# Patient Record
Sex: MALE | Race: WHITE | NOT HISPANIC OR LATINO
[De-identification: names, ages, dates, MRNs, and addresses within clinical notes are randomized per-mention and may not be internally consistent; named-entity substitution may affect disease eponyms.]

---

## 2019-01-07 ENCOUNTER — APPOINTMENT (OUTPATIENT)
Dept: ORTHOPEDIC SURGERY | Facility: CLINIC | Age: 54
End: 2019-01-07
Payer: COMMERCIAL

## 2019-01-07 VITALS — BODY MASS INDEX: 24.91 KG/M2 | HEIGHT: 70 IN | WEIGHT: 174 LBS

## 2019-01-07 DIAGNOSIS — M75.41 IMPINGEMENT SYNDROME OF RIGHT SHOULDER: ICD-10-CM

## 2019-01-07 DIAGNOSIS — Z00.00 ENCOUNTER FOR GENERAL ADULT MEDICAL EXAMINATION W/OUT ABNORMAL FINDINGS: ICD-10-CM

## 2019-01-07 PROCEDURE — 99204 OFFICE O/P NEW MOD 45 MIN: CPT | Mod: 25

## 2019-01-07 PROCEDURE — 73030 X-RAY EXAM OF SHOULDER: CPT | Mod: RT

## 2019-01-07 PROCEDURE — 20610 DRAIN/INJ JOINT/BURSA W/O US: CPT | Mod: RT

## 2019-01-07 NOTE — PHYSICAL EXAM
[de-identified] : Constitutional\par o Appearance : well-nourished, well developed, alert, in no acute distress \par Head and Face\par o Head :\par ¦ Inspection : atraumatic, normocephalic\par Neurologic\par o Mental Status Examination :\par ¦ Orientation : alert and oriented X 3\par Psychiatric\par o Mood and Affect: mood normal, affect appropriate \par Cardiovascular\par o Observation/Palpation : - no swelling,normal pulses, normal temperature \par Lymphatic\par o Additional Nodes : No palpable lymph nodes present \par \par Cervical Spine\par o Inspection/Palpation :\par ¦ Inspection : alignment midline, normal degree of lordosis present, no paracervical tenderness\par ¦ Skin : normal appearance, no masses or tenderness, trachea midline\par ¦ Palpation : musculature is nontender to palpation\par o Range of Motion : arc of motion full in all planes, no crepitance or pain with ROM\par Tests: Negative Spurling test, negative Romberg test\par \par Right Upper Extremity\par o Shoulder :\par ¦ Inspection/Palpation :mild anterior capsular  tenderness, swelling or deformities\par ¦ Range of Motion : full motion and minimal discomfort at the extremes of motion, no crepitance\par ¦ Strength : forward elevation, internal rotation, external rotation 4/5, adduction and abduction 5/5, forward flexion and supraspinatus 4-/5, bicep 5/5\par ¦ Stability : no joint instability on provocative testing \par Tests: Jaimes negative, Neer negative, positive Sariah test, negative drop arm test\par \par Left Upper Extremity\par o Shoulder :\par ¦ Inspection/Palpation : no tenderness, swelling or deformities\par ¦ Range of Motion : full and painless in all planes, no crepitance\par ¦ Strength : forward elevation, internal rotation, external rotation, adduction and abduction 5/5\par ¦ Stability : no joint instability on provocative testing\par Tests: Jaimes negative, Neer negative, negative Sariah test, negative drop arm test\par Gait: Normal, no significant extremity swelling or lymphedema\par \par Radiology Results \par o Right Shoulder : AP internal/external rotation and outlet views were obtained and reveal sclerosis of greater tuberosity with mild glenohumeral arthritis \par \par Injection:\par o Shoulder: Indication- shoulder arthritis, Anatomic location right subacromial space, Spray-area was sterilized with Betadine and alcohol and anesthetized with Ethyl Chloride , Needle used - 22G, Medications given- 5cc's lidocaine, 0.5cc's kenalog, 0.5cc's dexamethasone, and patient tolerated it well. This was done under Ultrasound guidance.\par Exam after the subacromial injection revealed complete improvement of his impingement signs and his strength returned to normal\par  \par \par

## 2019-01-07 NOTE — ADDENDUM
[FreeTextEntry1] : I, Matthew Shaffer , acted solely as a scribe for Dr. Valdez Garcia on this date 01/07/2019.\par All medical record entries made by the Scribe were at my, Dr. Valdez Garcia, direction and personally dictated by me on 01/07/2019. I have reviewed the chart and agree that the record accurately reflects my personal performance of the history, physical exam, assessment and plan. I have also personally directed, reviewed, and agreed with the chart.

## 2019-01-07 NOTE — HISTORY OF PRESENT ILLNESS
[de-identified] : Patient presents with right shoulder pain exacerbated by lifting his right arm. He feels pain when he lies on his right shoulder, but it does not wake him up from sleep. He  notes no recent injury. He is right-hand dominant. He denies numbness, tingling, and neck pain. He is generally healthy. Some weightlifting recently and may have aggravated it.  He notes it's going on at least 3 months.  He is right-hand dominant.  It does affect his sleep and he has difficulty lying on the right shoulder.

## 2019-01-07 NOTE — DISCUSSION/SUMMARY
[de-identified] : Patient elected to receive a cortisone injection into his right shoulder, he tolerated it well. His right shoulder strength was improved after the injection. He is to avoid abduction motion of his right arm, heavy loading his right shoulder and overhead activities. We discussed the use of ice to relieve pain. Follow-up in 4- 6 weeks.

## 2021-11-26 ENCOUNTER — EMERGENCY (EMERGENCY)
Facility: HOSPITAL | Age: 56
LOS: 1 days | Discharge: HOME | End: 2021-11-26
Attending: EMERGENCY MEDICINE
Payer: COMMERCIAL

## 2021-11-26 VITALS
DIASTOLIC BLOOD PRESSURE: 82 MMHG | OXYGEN SATURATION: 97 % | TEMPERATURE: 98 F | SYSTOLIC BLOOD PRESSURE: 124 MMHG | RESPIRATION RATE: 16 BRPM | HEART RATE: 94 BPM

## 2021-11-26 VITALS
WEIGHT: 160.06 LBS | HEIGHT: 69 IN | RESPIRATION RATE: 20 BRPM | HEART RATE: 114 BPM | SYSTOLIC BLOOD PRESSURE: 133 MMHG | OXYGEN SATURATION: 97 % | TEMPERATURE: 98 F | DIASTOLIC BLOOD PRESSURE: 91 MMHG

## 2021-11-26 LAB
ALBUMIN SERPL ELPH-MCNC: 4.5 G/DL — SIGNIFICANT CHANGE UP (ref 3.3–5)
ALP SERPL-CCNC: 96 U/L — SIGNIFICANT CHANGE UP (ref 40–120)
ALT FLD-CCNC: 22 U/L — SIGNIFICANT CHANGE UP (ref 10–45)
ANION GAP SERPL CALC-SCNC: 14 MMOL/L — SIGNIFICANT CHANGE UP (ref 5–17)
AST SERPL-CCNC: 22 U/L — SIGNIFICANT CHANGE UP (ref 10–40)
BASOPHILS # BLD AUTO: 0.06 K/UL — SIGNIFICANT CHANGE UP (ref 0–0.2)
BASOPHILS NFR BLD AUTO: 0.8 % — SIGNIFICANT CHANGE UP (ref 0–2)
BILIRUB SERPL-MCNC: 0.3 MG/DL — SIGNIFICANT CHANGE UP (ref 0.2–1.2)
BUN SERPL-MCNC: 20 MG/DL — SIGNIFICANT CHANGE UP (ref 7–23)
CALCIUM SERPL-MCNC: 9.6 MG/DL — SIGNIFICANT CHANGE UP (ref 8.4–10.5)
CHLORIDE SERPL-SCNC: 102 MMOL/L — SIGNIFICANT CHANGE UP (ref 96–108)
CO2 SERPL-SCNC: 23 MMOL/L — SIGNIFICANT CHANGE UP (ref 22–31)
CREAT SERPL-MCNC: 1.02 MG/DL — SIGNIFICANT CHANGE UP (ref 0.5–1.3)
EOSINOPHIL # BLD AUTO: 0.19 K/UL — SIGNIFICANT CHANGE UP (ref 0–0.5)
EOSINOPHIL NFR BLD AUTO: 2.5 % — SIGNIFICANT CHANGE UP (ref 0–6)
GLUCOSE SERPL-MCNC: 135 MG/DL — HIGH (ref 70–99)
HCT VFR BLD CALC: 48.3 % — SIGNIFICANT CHANGE UP (ref 39–50)
HGB BLD-MCNC: 16.1 G/DL — SIGNIFICANT CHANGE UP (ref 13–17)
IMM GRANULOCYTES NFR BLD AUTO: 0.3 % — SIGNIFICANT CHANGE UP (ref 0–1.5)
LYMPHOCYTES # BLD AUTO: 2.5 K/UL — SIGNIFICANT CHANGE UP (ref 1–3.3)
LYMPHOCYTES # BLD AUTO: 32.9 % — SIGNIFICANT CHANGE UP (ref 13–44)
MCHC RBC-ENTMCNC: 30.6 PG — SIGNIFICANT CHANGE UP (ref 27–34)
MCHC RBC-ENTMCNC: 33.3 GM/DL — SIGNIFICANT CHANGE UP (ref 32–36)
MCV RBC AUTO: 91.7 FL — SIGNIFICANT CHANGE UP (ref 80–100)
MONOCYTES # BLD AUTO: 0.53 K/UL — SIGNIFICANT CHANGE UP (ref 0–0.9)
MONOCYTES NFR BLD AUTO: 7 % — SIGNIFICANT CHANGE UP (ref 2–14)
NEUTROPHILS # BLD AUTO: 4.29 K/UL — SIGNIFICANT CHANGE UP (ref 1.8–7.4)
NEUTROPHILS NFR BLD AUTO: 56.5 % — SIGNIFICANT CHANGE UP (ref 43–77)
NRBC # BLD: 0 /100 WBCS — SIGNIFICANT CHANGE UP (ref 0–0)
PLATELET # BLD AUTO: 264 K/UL — SIGNIFICANT CHANGE UP (ref 150–400)
POTASSIUM SERPL-MCNC: 3.8 MMOL/L — SIGNIFICANT CHANGE UP (ref 3.5–5.3)
POTASSIUM SERPL-SCNC: 3.8 MMOL/L — SIGNIFICANT CHANGE UP (ref 3.5–5.3)
PROT SERPL-MCNC: 7.6 G/DL — SIGNIFICANT CHANGE UP (ref 6–8.3)
RBC # BLD: 5.27 M/UL — SIGNIFICANT CHANGE UP (ref 4.2–5.8)
RBC # FLD: 12.5 % — SIGNIFICANT CHANGE UP (ref 10.3–14.5)
SODIUM SERPL-SCNC: 139 MMOL/L — SIGNIFICANT CHANGE UP (ref 135–145)
WBC # BLD: 7.59 K/UL — SIGNIFICANT CHANGE UP (ref 3.8–10.5)
WBC # FLD AUTO: 7.59 K/UL — SIGNIFICANT CHANGE UP (ref 3.8–10.5)

## 2021-11-26 PROCEDURE — 85025 COMPLETE CBC W/AUTO DIFF WBC: CPT

## 2021-11-26 PROCEDURE — 80053 COMPREHEN METABOLIC PANEL: CPT

## 2021-11-26 PROCEDURE — 99284 EMERGENCY DEPT VISIT MOD MDM: CPT

## 2021-11-26 PROCEDURE — 96374 THER/PROPH/DIAG INJ IV PUSH: CPT

## 2021-11-26 PROCEDURE — 36415 COLL VENOUS BLD VENIPUNCTURE: CPT

## 2021-11-26 PROCEDURE — 96375 TX/PRO/DX INJ NEW DRUG ADDON: CPT

## 2021-11-26 PROCEDURE — 99284 EMERGENCY DEPT VISIT MOD MDM: CPT | Mod: 25

## 2021-11-26 RX ORDER — DIPHENHYDRAMINE HCL 50 MG
50 CAPSULE ORAL ONCE
Refills: 0 | Status: COMPLETED | OUTPATIENT
Start: 2021-11-26 | End: 2021-11-26

## 2021-11-26 RX ORDER — SODIUM CHLORIDE 9 MG/ML
1000 INJECTION INTRAMUSCULAR; INTRAVENOUS; SUBCUTANEOUS ONCE
Refills: 0 | Status: COMPLETED | OUTPATIENT
Start: 2021-11-26 | End: 2021-11-26

## 2021-11-26 RX ORDER — EPINEPHRINE 0.3 MG/.3ML
0.3 INJECTION INTRAMUSCULAR; SUBCUTANEOUS
Qty: 1 | Refills: 0
Start: 2021-11-26

## 2021-11-26 RX ORDER — FAMOTIDINE 10 MG/ML
20 INJECTION INTRAVENOUS ONCE
Refills: 0 | Status: COMPLETED | OUTPATIENT
Start: 2021-11-26 | End: 2021-11-26

## 2021-11-26 RX ADMIN — SODIUM CHLORIDE 1000 MILLILITER(S): 9 INJECTION INTRAMUSCULAR; INTRAVENOUS; SUBCUTANEOUS at 18:41

## 2021-11-26 RX ADMIN — Medication 125 MILLIGRAM(S): at 17:57

## 2021-11-26 RX ADMIN — FAMOTIDINE 20 MILLIGRAM(S): 10 INJECTION INTRAVENOUS at 17:57

## 2021-11-26 RX ADMIN — Medication 50 MILLIGRAM(S): at 17:57

## 2021-11-26 RX ADMIN — SODIUM CHLORIDE 1000 MILLILITER(S): 9 INJECTION INTRAMUSCULAR; INTRAVENOUS; SUBCUTANEOUS at 18:48

## 2021-11-26 NOTE — ED PROVIDER NOTE - ATTENDING CONTRIBUTION TO CARE
I performed a history and physical exam of the patient and discussed their management with the resident and /or advanced care provider. I reviewed the resident and /or ACP's note and agree with the documented findings and plan of care. My medical decision making and observations are found above.  Lung clear, abd soft, no stridor, itchy red rash.

## 2021-11-26 NOTE — ED PROVIDER NOTE - OBJECTIVE STATEMENT
57y/o M w/ h/o allergies to almonds p/w SOB and rash. Pt states that he was gym and felt hot rash on upper body and difficulty breathing apprx 1 h prior to arrival. No chest pain, syncope, nausea, vomiting, diarrhea, abdominal pain.

## 2021-11-26 NOTE — ED PROVIDER NOTE - NSFOLLOWUPINSTRUCTIONS_ED_ALL_ED_FT
Use prednisone for next 2 days. Use benadryl for itch as needed or if symptoms return at any point.   Consider having epi pen at home.  Return to Emergency Department if you get return of mouth or throat swelling or if you have new complaints.

## 2021-11-26 NOTE — ED ADULT TRIAGE NOTE - CHIEF COMPLAINT QUOTE
allergic reaction while in gym, swelling around eyes, voice changes, hand redness, mild shortness of breath

## 2021-11-26 NOTE — ED PROVIDER NOTE - CLINICAL SUMMARY MEDICAL DECISION MAKING FREE TEXT BOX
healthy m w/ h/o allergies p/w rash on upper back, swelling under b/l eyelids and subjective sob with no wheezing. vss. plan h1/h2 blockers, steroids and reassess. healthy m w/ h/o allergies p/w rash on upper back, swelling under b/l eyelids and subjective sob with no wheezing. vss. plan h1/h2 blockers, steroids and reassess.  Cecile: itching, reddening of skin. + mouth and throat sx but no sob. no cough. Patient given benadryl and steroids. will observe for 2+hrs and then D/C on pred/benadyl

## 2021-11-26 NOTE — ED PROVIDER NOTE - PATIENT PORTAL LINK FT
You can access the FollowMyHealth Patient Portal offered by [facility] by registering at the following website: [email or web address]. By joining [employer]’s FollowMyHealth portal, you will also be able to view your health information using other applications (apps) compatible with our system.

## 2021-11-26 NOTE — ED ADULT NURSE NOTE - OBJECTIVE STATEMENT
56M to ED c/o allergic reaction that started while at the gym. Unknown what the source of exposure was. Patient c/o acute onset rash and congestion and change in voice while at the gym. Patient is alert and oriented x4, calm/cooperative, VSS. Patient has 18 gauge to LAC at this time.

## 2023-03-19 ENCOUNTER — EMERGENCY (EMERGENCY)
Facility: HOSPITAL | Age: 58
LOS: 1 days | Discharge: HOME | End: 2023-03-19
Attending: EMERGENCY MEDICINE
Payer: COMMERCIAL

## 2023-03-19 VITALS
OXYGEN SATURATION: 99 % | RESPIRATION RATE: 20 BRPM | HEIGHT: 69 IN | SYSTOLIC BLOOD PRESSURE: 145 MMHG | WEIGHT: 162.04 LBS | TEMPERATURE: 98 F | HEART RATE: 106 BPM | DIASTOLIC BLOOD PRESSURE: 87 MMHG

## 2023-03-19 VITALS
RESPIRATION RATE: 18 BRPM | SYSTOLIC BLOOD PRESSURE: 124 MMHG | OXYGEN SATURATION: 100 % | TEMPERATURE: 98 F | HEART RATE: 95 BPM | DIASTOLIC BLOOD PRESSURE: 68 MMHG

## 2023-03-19 LAB
FLUAV AG NPH QL: SIGNIFICANT CHANGE UP
FLUBV AG NPH QL: SIGNIFICANT CHANGE UP
RSV RNA NPH QL NAA+NON-PROBE: SIGNIFICANT CHANGE UP
SARS-COV-2 RNA SPEC QL NAA+PROBE: SIGNIFICANT CHANGE UP

## 2023-03-19 PROCEDURE — 96375 TX/PRO/DX INJ NEW DRUG ADDON: CPT

## 2023-03-19 PROCEDURE — 99284 EMERGENCY DEPT VISIT MOD MDM: CPT

## 2023-03-19 PROCEDURE — 96374 THER/PROPH/DIAG INJ IV PUSH: CPT

## 2023-03-19 PROCEDURE — 87637 SARSCOV2&INF A&B&RSV AMP PRB: CPT

## 2023-03-19 PROCEDURE — 93005 ELECTROCARDIOGRAM TRACING: CPT

## 2023-03-19 PROCEDURE — 99284 EMERGENCY DEPT VISIT MOD MDM: CPT | Mod: 25

## 2023-03-19 RX ORDER — FAMOTIDINE 10 MG/ML
20 INJECTION INTRAVENOUS ONCE
Refills: 0 | Status: COMPLETED | OUTPATIENT
Start: 2023-03-19 | End: 2023-03-19

## 2023-03-19 RX ORDER — FAMOTIDINE 10 MG/ML
20 INJECTION INTRAVENOUS ONCE
Refills: 0 | Status: DISCONTINUED | OUTPATIENT
Start: 2023-03-20 | End: 2023-03-23

## 2023-03-19 RX ORDER — DIPHENHYDRAMINE HCL 50 MG
50 CAPSULE ORAL ONCE
Refills: 0 | Status: COMPLETED | OUTPATIENT
Start: 2023-03-19 | End: 2023-03-19

## 2023-03-19 RX ADMIN — Medication 50 MILLIGRAM(S): at 19:10

## 2023-03-19 RX ADMIN — Medication 125 MILLIGRAM(S): at 19:10

## 2023-03-19 RX ADMIN — FAMOTIDINE 20 MILLIGRAM(S): 10 INJECTION INTRAVENOUS at 19:10

## 2023-03-19 NOTE — ED ADULT NURSE REASSESSMENT NOTE - NS ED NURSE REASSESS COMMENT FT1
Received report from JIE Ray. Pt is A&Ox3, breathing spontaneously, unlabored and speaking in full sentences on RA. Pt safety and comfort measures provided.

## 2023-03-19 NOTE — ED PROVIDER NOTE - PROGRESS NOTE DETAILS
Pt feeling improved, periorbital edema almost completely resolved, on repeat exam no rashes and lungs CTAB after 5+ hour observation period, pt requesting discharge. Discussed results of workup, importance of follow-up with allergist, epipen for anaphylaxis, course of prednisone for allergic rxn, and return precautions with patient who verbalized understanding and agreement. Pt had opportunity to ask questions and address concerns; medically clear for DC at this time. -Matilde Alcantara, PGY-2

## 2023-03-19 NOTE — ED PROVIDER NOTE - PHYSICAL EXAMINATION
Gen: AAOx3, non-toxic well-appearing gentleman lying in bed in NAD  Head: NCAT  HEENT: EOMI, oral mucosa moist, normal conjunctiva, +severe periorbital edema R > L  Lung: CTAB, no respiratory distress, no wheezes/rhonchi/rales B/L, speaking in full sentences  CV: RRR, no murmurs, rubs or gallops  Abd: soft, NTND, no guarding, no CVA tenderness  MSK: no visible deformities  Neuro: No focal sensory or motor deficits  Skin: Warm, well perfused, no rash  Psych: pleasant, normal affect.   ~Matilde Alcantara M.D. Resident

## 2023-03-19 NOTE — ED PROVIDER NOTE - PATIENT PORTAL LINK FT
You can access the FollowMyHealth Patient Portal offered by [facility] by registering at the following website: [email or web address]. By joining [facility]’s FollowMyHealth portal, you will also be able to view your health information using other applications (apps) compatible with our system.

## 2023-03-19 NOTE — ED ADULT TRIAGE NOTE - CHIEF COMPLAINT QUOTE
mitesh eye swelling - allergic reaction to almond protein bar, took benadryl 50 mg PO, +has epi pen - did not use, +speaking in full sentences

## 2023-03-19 NOTE — ED CDU PROVIDER INITIAL DAY NOTE - OBJECTIVE STATEMENT
Pt is 56 y/o male, presenting to the ED c/o allergic rxn. As per pt, was ~4:30 the gym when he ate a protein bar that he has never had before. Pt reports that approx 30 mins later, he began noticing swelling to the eyes and SOB. Pt reports similar episode happened x2 years ago and required epi, but never followed up w/ allergist so is not sure exactly what he is allergic to. Pt is 58 y/o male, presenting to the ED c/o allergic rxn. As per pt, ~4:30pm was at the gym when he ate a protein bar that he has never had before. Pt reports that approx 30 mins later, he began noticing swelling to the eyes and SOB. Pt reports similar episode happened x2 years ago and required epi, but never followed up w/ allergist so is not sure exactly what he is allergic to. Pt took Benadryl and did not use epi prior to coming to ED.  In ED, patient was given IV Benadryl, Pepcid, Solumedrol and sent to CDU for frequent reeval, vitals q 4hrs, and further observation.

## 2023-03-19 NOTE — ED PROVIDER NOTE - NS ED ROS FT
GENERAL: No fever or chills, EYES: no change in vision, +EYE SWELLING, HEENT: no trouble swallowing or speaking, CARDIAC: no chest pain, PULMONARY: no cough or SOB, GI: no abdominal pain, no nausea, no vomiting, no diarrhea or constipation, : No changes in urination, SKIN: no rashes, NEURO: no headache, MSK: No joint pain     All other ROS negative unless otherwise specified in HPI.     ~Matilde Alcantara M.D. Resident

## 2023-03-19 NOTE — ED PROVIDER NOTE - OBJECTIVE STATEMENT
Pt is a 57yoM w/Hx of GERD p/w eye swelling i/s/o allergic reaction. Pt is a 57yoM w/Hx of GERD p/w eye swelling i/s/o allergic reaction. ***incomplete note*** Pt is a 57yoM w/Hx of GERD p/w eye swelling i/s/o allergic reaction. Around 4:40 PM patient states he ate a protein bar, is allergic to almonds, states the protein bar had almond butter in it which he did not know, began experiencing  bilateral eye swelling associated with itching hands and throat, took 50 mg p.o. Benadryl,  itching of hands and throat resolved however eye swelling persisted so he came to ED.  Denies HA, sweating, dizziness/lightheadedness, rashes, current itching, abdominal pain, chest pain, shortness of breath, nausea/vomiting, diarrhea/constipation, no other known exposures or allergens.

## 2023-03-19 NOTE — ED PROVIDER NOTE - CLINICAL SUMMARY MEDICAL DECISION MAKING FREE TEXT BOX
57-year-old male with history of GERD presenting with allergic reaction, severe periorbital edema, previously had itching of hands and throat, resolved with p.o. Benadryl prior to arrival.  Vital signs unremarkable, physical exam with severe periorbital edema right worse than left, lungs are clear, no abdominal tenderness, no reported associated symptoms concerning for anaphylaxis at this time.  Given onset and premedication prior to arrival, will hold off on epinephrine for now, given Benadryl, steroids,  famotidine, observe for 6 hours, reassess, likely discharge to home. - Matilde Alcantara, PGY-2 57-year-old male with history of GERD presenting with allergic reaction, severe periorbital edema, previously had itching of hands and throat, resolved with p.o. Benadryl prior to arrival.  Vital signs unremarkable, physical exam with severe periorbital edema right worse than left, lungs are clear, no abdominal tenderness, no reported associated symptoms concerning for anaphylaxis at this time.  Given onset and premedication prior to arrival, will hold off on epinephrine for now, given Benadryl, steroids,  famotidine, observe for 6 hours, reassess, likely discharge to home. - Matilde Alcantara, PGY-2    Kayla: 57 year old male with GERD here with allergic reaction, severe periobital edema here with itching of hands/throat after eating a bar possibly containing almonds. took benadryl prior to arrival but worse now. patient states mild ithching of throat, no sob, no tongue swelling/no lip swelling. PE as above. will give benadryl, steroids, pepcid, observe closely for repeat reaction. reassess.

## 2023-03-19 NOTE — ED ADULT NURSE NOTE - OBJECTIVE STATEMENT
Pt is 56 y/o male, presenting to the ED c/o allergic rxn. As per pt, was @ the gym when he ate a protein bar that he has never had before. Pt reports that approx 30 mins later, he began noticing swelling to the eyes and SOB. Pt reports similar episode happened x2 years ago and required epi, but never followed up w/ allergist so is not sure exactly what he is allergic to. Pt has epi-pen @ bedside, denies use of. MD Garza @ bedside, airway intact, no wheezing or stridor noted. Pt AxOx3, sitting up in stretcher and speaking in full sentences. Breathing spontaneously and unlabored. Abdomen is soft and non-tender to palpation. Pt has swelling noted b/l to orbital region. Pt ambulates w/ steady gait and moves all extremities w/ = strength. Skin warm, dry, and intact w/ + peripheral pulses. No rash noted. Pt denies chest pain, n/v/d, dizziness, chills, and fever. Safety and comfort measures provided- bed in lowest position, locked, and blanket given.

## 2023-03-19 NOTE — ED PROVIDER NOTE - NSFOLLOWUPINSTRUCTIONS_ED_ALL_ED_FT
Please follow up with your doctor within 1 week. Bring copies of your results with you (provided in your discharge paperwork).     YOU HAVE BEEN PRESCRIBED A COURSE OF PREDNISONE FOR YOUR ALLERGIC REACTION. Please take 1 tab once per day for the next 5 days. Please completely finish this medication even if you begin to feel better.     You have also been prescribed epipens, please pick them up and use if you experience ANY SYMPTOMS OF SEVERE ALLERGIC REACTION (see below).     WHAT YOU NEED TO KNOW:  A food allergy is an immune system reaction to a food. A food allergen is an ingredient or chemical in a food that causes your immune system to react. Allergic reactions happen when your immune system fights too strongly against an allergen and causes you to get sick. Allergic reactions can happen within minutes to several hours after you eat, touch, or smell the food. You can also have a second reaction up to 8 hours later.    DISCHARGE INSTRUCTIONS:  Call your local emergency number (911 in the [place]) for signs or symptoms of anaphylaxis, such as trouble breathing, swelling in your mouth or throat, or wheezing. You may also have itching, a rash, hives, or feel like you are going to faint.    Return to the emergency department if:   •Your mouth, tongue, or throat swells.  •You have itching or hives that spread all over your body.    Call your doctor if:   •You have new or worsening rashes, hives, or itching.  •You have an upset stomach or are vomiting.  •You have stomach cramps or diarrhea.  •You have questions about your treatment, medicine, or care.    Medicines: You may need any of the following:  •Epinephrine is used to treat severe allergic reactions such as anaphylaxis.  •Antihistamines decrease mild symptoms such as itching or a rash.  •Steroids may be given to decrease inflammation.  •Short-acting bronchodilators help open your airways quickly. These medicines relieve sudden, severe symptoms and start to work right away. They may be called rescue inhalers or relievers.       •Take your medicine as directed. Contact your healthcare provider if you think your medicine is not helping or if you have side effects. Tell your provider if you are allergic to any medicine. Keep a list of the medicines, vitamins, and herbs you take. Include the amounts, and when and why you take them. Bring the list or the pill bottles to follow-up visits. Carry your medicine list with you in case of an emergency.      Steps to take for signs or symptoms of anaphylaxis:   •Immediately give 1 shot of epinephrine only into the outer thigh muscle.  •Leave the shot in place as directed. Your provider may recommend you leave it in place for up to 10 seconds before you remove it. This helps make sure all of the epinephrine is delivered.  •Call 911 and go to the emergency department, even if the shot improved symptoms. Do not drive yourself. Bring the used epinephrine shot with you.    Safety precautions to take if you are at risk for anaphylaxis:   •Keep 2 shots of epinephrine with you at all times. You may need a second shot, because epinephrine only works for about 20 minutes and symptoms may return. Your provider can show you and family members how to give the shot. Check the expiration date every month and replace it before it expires.  •Create an action plan. Your provider can help you create a written plan that explains the allergy and an emergency plan to treat a reaction. The plan explains when to give a second epinephrine shot if symptoms return or do not improve after the first. Give copies of the action plan and emergency instructions to family members, work and school staff, and  providers. Show them how to give a shot of epinephrine. Update the plan as the allergy changes.  •Do not eat the food that causes your allergy. Even a small taste can cause an allergic reaction. Your provider or a dietitian can help you plan a balanced diet. Babies may need to drink a formula that does not contain milk or soy. A dietitian can teach you how to read labels for ingredients that cause your allergies.  •Carry medical alert identification. Wear jewelry or carry a card that says you have a food allergy. Ask your provider where to get these items.  •Ask about ingredients in foods prepared outside your home. When you eat out, ask what is in the food you want to order. Ask how food is prepared. Fried foods may contain small amounts of food allergens, such as nuts and shellfish.  •Be careful when you exercise. If you have had exercise-induced anaphylaxis, do not exercise right after you eat. Stop exercising right away if you start to develop any signs or symptoms of anaphylaxis. You may first feel tired, warm, or have itchy skin. Hives, swelling, and severe breathing problems may develop if you continue to exercise.  •Wash your hands before and after meals. Do not share utensils or food.    Vaccines and egg allergy: Tell healthcare providers if you have an egg allergy before you receive any vaccine. Some vaccines contain egg protein that can cause an anaphylactic reaction. The flu vaccine is considered safe for a person with an egg allergy. An egg-free vaccine may be available. Your provider will tell you if you should get the egg-free vaccine instead. You may need tests before you can receive certain other vaccines. Your provider can give you more information.    Follow up with your doctor as directed: You may need to see specialists for ongoing care. Your provider may want to test you regularly to see if the food allergy changes. Write down your questions so you remember to ask them during follow-up visits.

## 2023-03-19 NOTE — ED CDU PROVIDER INITIAL DAY NOTE - DETAILS
- observation status and frequent re-evaluation  - Benadryl prn, Pepcid 20mg Q12H, Prednisone 50mg PO in AM  - d/c with RX for Epipen, Prednisone  - plan d/w Kayla Pt requested to be discharged, declined CDU observation

## 2023-03-19 NOTE — ED CDU PROVIDER DISPOSITION NOTE - NSFOLLOWUPINSTRUCTIONS_ED_ALL_ED_FT
1. Follow up with your PMD within 48-72 hours.   You may schedule appointment with Cardiology clinic this week by calling [phone]  2. Show copies of your reports given to you. Recommend Aspirin 81mg over the counter daily until further evaluation.  Take all of your other medications as previously prescribed.   3. Worsening or continued chest pain, shortness of breath, weakness, return to ED.

## 2023-03-19 NOTE — ED CDU PROVIDER DISPOSITION NOTE - CLINICAL COURSE
Pt is 58 y/o male, presenting to the ED c/o allergic rxn. As per pt, ~4:30pm was at the gym when he ate a protein bar that he has never had before. Pt reports that approx 30 mins later, he began noticing swelling to the eyes and SOB. Pt reports similar episode happened x2 years ago and required epi, but never followed up w/ allergist so is not sure exactly what he is allergic to. Pt took Benadryl and did not use epi prior to coming to ED.  In ED, patient was given IV Benadryl, Pepcid, Solumedrol and sent to CDU for frequent reeval, vitals q 4hrs, and further observation.

## 2023-03-20 RX ORDER — EPINEPHRINE 0.3 MG/.3ML
0.3 INJECTION INTRAMUSCULAR; SUBCUTANEOUS
Qty: 2 | Refills: 0
Start: 2023-03-20 | End: 2023-03-21